# Patient Record
Sex: FEMALE
[De-identification: names, ages, dates, MRNs, and addresses within clinical notes are randomized per-mention and may not be internally consistent; named-entity substitution may affect disease eponyms.]

---

## 2017-06-06 ENCOUNTER — CHARTING TRANS (OUTPATIENT)
Dept: OTHER | Age: 37
End: 2017-06-06

## 2017-12-17 ENCOUNTER — LAB SERVICES (OUTPATIENT)
Dept: OTHER | Age: 37
End: 2017-12-17

## 2017-12-17 ENCOUNTER — CHARTING TRANS (OUTPATIENT)
Dept: OTHER | Age: 37
End: 2017-12-17

## 2017-12-17 LAB — RAPID STREP GROUP A: NORMAL

## 2018-01-02 ENCOUNTER — CHARTING TRANS (OUTPATIENT)
Dept: OTHER | Age: 38
End: 2018-01-02

## 2018-01-02 ENCOUNTER — LAB SERVICES (OUTPATIENT)
Dept: OTHER | Age: 38
End: 2018-01-02

## 2018-01-02 LAB
FLU A AG RAPID SCREEN: POSITIVE
FLU B AG RAPID SCREEN: NORMAL
FLU RAPID SCREEN: POSITIVE
SOURCE: NORMAL

## 2018-11-02 VITALS
WEIGHT: 190 LBS | HEIGHT: 63 IN | RESPIRATION RATE: 18 BRPM | SYSTOLIC BLOOD PRESSURE: 120 MMHG | HEART RATE: 89 BPM | OXYGEN SATURATION: 99 % | TEMPERATURE: 98.6 F | BODY MASS INDEX: 33.66 KG/M2 | DIASTOLIC BLOOD PRESSURE: 70 MMHG

## 2018-11-02 VITALS
HEART RATE: 80 BPM | HEIGHT: 63 IN | BODY MASS INDEX: 33.66 KG/M2 | DIASTOLIC BLOOD PRESSURE: 74 MMHG | RESPIRATION RATE: 16 BRPM | SYSTOLIC BLOOD PRESSURE: 110 MMHG | TEMPERATURE: 98.1 F | WEIGHT: 190 LBS

## 2018-11-03 VITALS
WEIGHT: 190 LBS | DIASTOLIC BLOOD PRESSURE: 80 MMHG | OXYGEN SATURATION: 99 % | RESPIRATION RATE: 18 BRPM | HEART RATE: 98 BPM | SYSTOLIC BLOOD PRESSURE: 140 MMHG | HEIGHT: 63 IN | BODY MASS INDEX: 33.66 KG/M2 | TEMPERATURE: 98.1 F

## 2018-12-01 ENCOUNTER — HOSPITAL ENCOUNTER (EMERGENCY)
Age: 38
Discharge: HOME | End: 2018-12-01
Attending: EMERGENCY MEDICINE
Payer: COMMERCIAL

## 2018-12-01 ENCOUNTER — APPOINTMENT (OUTPATIENT)
Dept: GENERAL RADIOLOGY | Age: 38
End: 2018-12-01
Attending: EMERGENCY MEDICINE
Payer: COMMERCIAL

## 2018-12-01 VITALS
HEART RATE: 65 BPM | TEMPERATURE: 99 F | HEIGHT: 63 IN | OXYGEN SATURATION: 99 % | RESPIRATION RATE: 18 BRPM | BODY MASS INDEX: 32.78 KG/M2 | DIASTOLIC BLOOD PRESSURE: 55 MMHG | SYSTOLIC BLOOD PRESSURE: 99 MMHG | WEIGHT: 185 LBS

## 2018-12-01 DIAGNOSIS — M54.50 ACUTE LEFT-SIDED LOW BACK PAIN WITHOUT SCIATICA: Primary | ICD-10-CM

## 2018-12-01 PROCEDURE — 87086 URINE CULTURE/COLONY COUNT: CPT | Performed by: EMERGENCY MEDICINE

## 2018-12-01 PROCEDURE — 81025 URINE PREGNANCY TEST: CPT | Performed by: EMERGENCY MEDICINE

## 2018-12-01 PROCEDURE — 99284 EMERGENCY DEPT VISIT MOD MDM: CPT | Performed by: EMERGENCY MEDICINE

## 2018-12-01 PROCEDURE — 81001 URINALYSIS AUTO W/SCOPE: CPT | Performed by: EMERGENCY MEDICINE

## 2018-12-01 PROCEDURE — 72110 X-RAY EXAM L-2 SPINE 4/>VWS: CPT | Performed by: EMERGENCY MEDICINE

## 2018-12-01 RX ORDER — HYDROCODONE BITARTRATE AND ACETAMINOPHEN 5; 325 MG/1; MG/1
1 TABLET ORAL ONCE
Status: COMPLETED | OUTPATIENT
Start: 2018-12-01 | End: 2018-12-01

## 2018-12-01 RX ORDER — CYCLOBENZAPRINE HCL 10 MG
10 TABLET ORAL 3 TIMES DAILY PRN
Qty: 15 TABLET | Refills: 0 | Status: SHIPPED | OUTPATIENT
Start: 2018-12-01 | End: 2018-12-08

## 2018-12-01 RX ORDER — HYDROCODONE BITARTRATE AND ACETAMINOPHEN 5; 325 MG/1; MG/1
1 TABLET ORAL EVERY 4 HOURS PRN
Qty: 15 TABLET | Refills: 0 | Status: SHIPPED | OUTPATIENT
Start: 2018-12-01 | End: 2018-12-11

## 2018-12-01 RX ORDER — CYCLOBENZAPRINE HCL 10 MG
10 TABLET ORAL ONCE
Status: COMPLETED | OUTPATIENT
Start: 2018-12-01 | End: 2018-12-01

## 2018-12-01 RX ORDER — IBUPROFEN 600 MG/1
600 TABLET ORAL ONCE
Status: COMPLETED | OUTPATIENT
Start: 2018-12-01 | End: 2018-12-01

## 2018-12-01 RX ORDER — IBUPROFEN 600 MG/1
600 TABLET ORAL EVERY 6 HOURS PRN
Qty: 30 TABLET | Refills: 0 | Status: SHIPPED | OUTPATIENT
Start: 2018-12-01

## 2018-12-01 NOTE — ED PROVIDER NOTES
Patient Seen in: THE [facility] Emergency Department In [facility]    History   Patient presents with:  Back Pain (musculoskeletal)    Stated Complaint: Lower back pain since Thursday.     HPI    Patient is a pleasant 50-year-old female, presenting for evaluation respirations are unlabored. HEART: Regular rate and rhythm. ABDOMEN: The abdomen is soft, nondistended, nontender. SKIN: Skin is pink warm and dry. There are no rashes. EXTREMITIES: The patient moves all 4 extremities freely.   No cyanosis, clubbing, Rodriguez Lilly MD on 12/01/2018 at 15:24     Approved by: Rodriguez Lilly MD              [facility]   Patient was placed on the cart and evaluated. X-ray of the lumbar spine was ordered. Norco, Flexeril, and Motrin was administered.     Results of the x-rays w

## (undated) NOTE — ED AVS SNAPSHOT
Domo Mccloud   MRN: [id number]    Department:  THE [facility] Emergency Department in [place]   Date of Visit:  12/1/2018           Disclosure     Insurance plans vary and the physician(s) referred by the ER may not be covered by your plan.  Please contact you tell this physician (or your personal doctor if your instructions are to return to your personal doctor) about any new or lasting problems. The primary care or specialist physician will see patients referred from the [facility] Emergency Department.  Shelton Lombard